# Patient Record
Sex: FEMALE | Race: WHITE
[De-identification: names, ages, dates, MRNs, and addresses within clinical notes are randomized per-mention and may not be internally consistent; named-entity substitution may affect disease eponyms.]

---

## 2017-06-24 ENCOUNTER — HEALTH MAINTENANCE LETTER (OUTPATIENT)
Age: 47
End: 2017-06-24

## 2017-12-11 ENCOUNTER — NURSE TRIAGE (OUTPATIENT)
Dept: NURSING | Facility: CLINIC | Age: 47
End: 2017-12-11

## 2017-12-12 NOTE — TELEPHONE ENCOUNTER
Caller is concerned that there is some redness around stitched from mole removal 5 days ago  Triage protocol reviewed; no fever; no tenderness ,no warmth, no drainage  Advised okay to apply antibiotic ointment to  suture line  Follow up in clinic if redness  Worsens or devlops any other signs of infection  Additional Information    Wound doesn't sound infected    Protocols used: WOUND INFECTION-ADULT-  Madiha Ruiz RN  [facility]

## 2018-06-30 ENCOUNTER — HEALTH MAINTENANCE LETTER (OUTPATIENT)
Age: 48
End: 2018-06-30